# Patient Record
Sex: MALE | Race: WHITE
[De-identification: names, ages, dates, MRNs, and addresses within clinical notes are randomized per-mention and may not be internally consistent; named-entity substitution may affect disease eponyms.]

---

## 2018-06-15 ENCOUNTER — HOSPITAL ENCOUNTER (EMERGENCY)
Dept: HOSPITAL 25 - ED | Age: 68
Discharge: HOME | End: 2018-06-15
Payer: MEDICARE

## 2018-06-15 VITALS — SYSTOLIC BLOOD PRESSURE: 141 MMHG | DIASTOLIC BLOOD PRESSURE: 75 MMHG

## 2018-06-15 DIAGNOSIS — I25.119: ICD-10-CM

## 2018-06-15 DIAGNOSIS — I10: ICD-10-CM

## 2018-06-15 DIAGNOSIS — R19.7: ICD-10-CM

## 2018-06-15 DIAGNOSIS — R53.1: ICD-10-CM

## 2018-06-15 DIAGNOSIS — Z79.899: ICD-10-CM

## 2018-06-15 DIAGNOSIS — E78.00: ICD-10-CM

## 2018-06-15 DIAGNOSIS — Z95.5: ICD-10-CM

## 2018-06-15 DIAGNOSIS — I49.3: ICD-10-CM

## 2018-06-15 DIAGNOSIS — R11.2: Primary | ICD-10-CM

## 2018-06-15 DIAGNOSIS — Z79.82: ICD-10-CM

## 2018-06-15 DIAGNOSIS — I44.4: ICD-10-CM

## 2018-06-15 DIAGNOSIS — K21.9: ICD-10-CM

## 2018-06-15 LAB
BASOPHILS # BLD AUTO: 0 10^3/UL (ref 0–0.2)
EOSINOPHIL # BLD AUTO: 0 10^3/UL (ref 0–0.6)
HCT VFR BLD AUTO: 47 % (ref 42–52)
HGB BLD-MCNC: 16 G/DL (ref 14–18)
INR PPP/BLD: 0.93 (ref 0.77–1.02)
LYMPHOCYTES # BLD AUTO: 0.5 10^3/UL (ref 1–4.8)
MCH RBC QN AUTO: 33 PG (ref 27–31)
MCHC RBC AUTO-ENTMCNC: 34 G/DL (ref 31–36)
MCV RBC AUTO: 96 FL (ref 80–94)
MONOCYTES # BLD AUTO: 0.2 10^3/UL (ref 0–0.8)
NEUTROPHILS # BLD AUTO: 9.6 10^3/UL (ref 1.5–7.7)
NRBC # BLD AUTO: 0 10^3/UL
NRBC BLD QL AUTO: 0
PLATELET # BLD AUTO: 265 10^3/UL (ref 150–450)
RBC # BLD AUTO: 4.86 10^6/UL (ref 4–5.4)
WBC # BLD AUTO: 10.4 10^3/UL (ref 3.5–10.8)

## 2018-06-15 PROCEDURE — 96375 TX/PRO/DX INJ NEW DRUG ADDON: CPT

## 2018-06-15 PROCEDURE — 84484 ASSAY OF TROPONIN QUANT: CPT

## 2018-06-15 PROCEDURE — 85730 THROMBOPLASTIN TIME PARTIAL: CPT

## 2018-06-15 PROCEDURE — 82553 CREATINE MB FRACTION: CPT

## 2018-06-15 PROCEDURE — 71045 X-RAY EXAM CHEST 1 VIEW: CPT

## 2018-06-15 PROCEDURE — 85025 COMPLETE CBC W/AUTO DIFF WBC: CPT

## 2018-06-15 PROCEDURE — 36415 COLL VENOUS BLD VENIPUNCTURE: CPT

## 2018-06-15 PROCEDURE — 83735 ASSAY OF MAGNESIUM: CPT

## 2018-06-15 PROCEDURE — 96374 THER/PROPH/DIAG INJ IV PUSH: CPT

## 2018-06-15 PROCEDURE — 82550 ASSAY OF CK (CPK): CPT

## 2018-06-15 PROCEDURE — 83690 ASSAY OF LIPASE: CPT

## 2018-06-15 PROCEDURE — 81003 URINALYSIS AUTO W/O SCOPE: CPT

## 2018-06-15 PROCEDURE — 86140 C-REACTIVE PROTEIN: CPT

## 2018-06-15 PROCEDURE — 83605 ASSAY OF LACTIC ACID: CPT

## 2018-06-15 PROCEDURE — 83880 ASSAY OF NATRIURETIC PEPTIDE: CPT

## 2018-06-15 PROCEDURE — 93005 ELECTROCARDIOGRAM TRACING: CPT

## 2018-06-15 PROCEDURE — 85610 PROTHROMBIN TIME: CPT

## 2018-06-15 PROCEDURE — 84443 ASSAY THYROID STIM HORMONE: CPT

## 2018-06-15 PROCEDURE — 99283 EMERGENCY DEPT VISIT LOW MDM: CPT

## 2018-06-15 PROCEDURE — 80053 COMPREHEN METABOLIC PANEL: CPT

## 2018-06-15 NOTE — ED
Zak RAMÍREZ Natalie, kerryibed for Bob Hearn MD on 06/15/18 at 2237 .





Re-Evaluation





- Re-Evaluation


  ** First Eval


Re-Evaluation Time: 23:14


Change: Improved


Comment: Pt's repeat troponin negative. pt resting comfortably in bed without 

any chest discomfort. Pt vomiting and diarrhea resolved. Pt tolerating po 

without difficulty.





Course/Dx





- Diagnoses


Provider Diagnoses: 


 Nausea, vomiting, and diarrhea








Discharge





- Sign-Out/Discharge


Documenting (check all that apply): Discharge/Admit/Transfer





- Discharge Plan


Condition: Stable


Disposition: HOME


Prescriptions: 


Metoclopramide TAB* [Reglan TAB*] 10 mg PO Q8H PRN #10 tab


 PRN Reason: Vomiting


Patient Education Materials:  Acute Nausea and Vomiting (ED), Acute Diarrhea (ED

)


Referrals: 


Ryne Bryant DO [Primary Care Provider] - 3 Days


Additional Instructions: 


Please follow up with your primary care provider.





RETURN TO THE ED FOR ANY NEW OR WORSENING SYMPTOMS.





- Billing Disposition and Condition


Condition: STABLE


Disposition: Home





The documentation as recorded by the Zak bailon Natalie accurately reflects 

the service I personally performed and the decisions made by Nadiya farmer Omari A, MD.

## 2018-06-15 NOTE — RAD
INDICATION:  Chest pain.



COMPARISON:  Comparison is made with a prior study from January 10, 2015.



TECHNIQUE: A portable view of the chest was obtained.



FINDINGS: Cardiac and mediastinal contours appear to be within normal limits.



The lungs are clear. No pleural effusion is seen.



IMPRESSION:  NO EVIDENCE FOR ACUTE DISEASE.

## 2018-06-18 NOTE — ED
Isauro RAMÍREZ Angela, scribed for Brandyn Lam MD on 06/15/18 at 1853 .





GI/ HPI





- HPI Summary


HPI Summary: 





This pt is a 67 y/o male presenting to [facility] via EMS c/o nausea, vomiting, and 

diarrhea since this morning. Pt reports dry heaves and chills all day today. He 

additionally states left arm weakness. He describes diarrhea as watery and 

brown. Pt describes emesis as clear/yellow, "almost like urine." Denies chest 

pain, SOB, abd pain, fever. 


Pt denies sick contacts at home. Denies recent antibiotics. He denies recent 

long distance traveling. 


PMHx includes cardiac stents. Pt states he was found to have PVCs by his 

cardiologist, Dr. Claudio. Dr. Claudio told the pt he will need to have stress 

tests done. 





- History of Current Complaint


Chief Complaint: EDGeneral


Time Seen by Provider: 06/15/18 18:29


Stated Complaint: VOMITING/LT ARM WEAKNESS


Hx Obtained From: Patient


Onset/Duration: Started Hours Ago, Still Present


Timing: Lasting Hours


Current Severity: None


Pain Intensity: 0 - denies pain


Associated Signs and Symptoms: Positive: Nausea, Vomiting, Diarrhea.  Negative: 

Fever, Chills, Abdominal Pain, Chest Pain


Aggravating Factor(s): Nothing


Alleviating Factor(s): Nothing





- Allergy/Home Medications


Allergies/Adverse Reactions: 


 Allergies











Allergy/AdvReac Type Severity Reaction Status Date / Time


 


No Known Allergies Allergy   Verified 01/19/15 05:04














PMH/Surg Hx/FS Hx/Imm Hx


Endocrine/Hematology History: 


   Denies: Hx Sickle Cell Disease


Cardiovascular History: Reports: Hx Angina, Hx Coronary Artery Disease, Hx 

Hypercholesterolemia, Hx Hypertension


   Denies: Hx Myocardial Infarction, Hx Valvular Heart Disease, Other 

Cardiovascular Problems/Disorders


Respiratory History: Reports: Hx Sleep Apnea


   Denies: Hx Asthma, Hx Chronic Obstructive Pulmonary Disease (COPD)


GI History: Reports: Hx Gastroesophageal Reflux Disease - TAKES MEDICATION, 

UNDER CONTROL


 History: 


   Denies: Other  Problems/Disorders


Musculoskeletal History: 


   Denies: Other Musculoskeletal History


Sensory History: Reports: Hx Cataracts, Hx Contacts or Glasses - ADVISED TO 

WEAR GLASSES DAY OF SURGERY


   Denies: Hx Hearing Aid


Opthamlomology History: Reports: Hx Cataracts, Hx Contacts or Glasses - ADVISED 

TO WEAR GLASSES DAY OF SURGERY


Neurological History: 


   Denies: Other Neuro Impairments/Disorders





- Surgical History


Surgery Procedure, Year, and Place: TONSILS AS CHILD.  COLONOSCOPY X 2


Hx Anesthesia Reactions: No


Infectious Disease History: No


Infectious Disease History: 


   Denies: Traveled Outside the [place] in Last 30 Days





- Family History


Known Family History: Positive: Diabetes - Mother


Family History: Mother: arthritis





- Social History


Alcohol Use: Occasionally


Alcohol Amount: 2 beers


Substance Use Type: Reports: None


Smoking Status (MU): Never Smoked Tobacco





Review of Systems


Positive: Chills.  Negative: Fever


Negative: Chest Pain


Negative: Shortness Of Breath


Positive: Vomiting, Diarrhea, Nausea.  Negative: Abdominal Pain


Genitourinary: Negative


Positive: Weakness - on left arm


All Other Systems Reviewed And Are Negative: Yes





Physical Exam





- Summary


Physical Exam Summary: 





VITAL SIGNS: Reviewed.


GENERAL: Patient is a well-developed and nourished male who is lying 

comfortable in the stretcher. Patient is not in any acute respiratory distress.


HEAD AND FACE: No signs of trauma. No ecchymosis, hematomas or skull 

depressions. No sinus tenderness.


EYES: PERRLA, EOMI x 2, No injected conjunctiva, no nystagmus.


EARS: Hearing grossly intact. Ear canals and tympanic membranes are within 

normal limits.


MOUTH: Oropharynx within normal limits.


NECK: Supple, trachea is midline, no adenopathy, no JVD, no carotid bruit, no c-

spine tenderness, neck with full ROM.


CHEST: Symmetric, no tenderness at palpation


LUNGS: Clear to auscultation bilaterally. No wheezing or crackles.


CVS: Regular rate and rhythm, S1 and S2 present, no murmurs or gallops 

appreciated.


ABDOMEN: Soft, non-tender. No signs of distention. No rebound no guarding, and 

no masses palpated. Bowel sounds are normal.


EXTREMITIES: FROM in all major joints, no edema, no cyanosis or clubbing.


NEURO: Alert and oriented x 3. No acute neurological deficits. Speech is normal 

and follows commands.


SKIN: Dry and warm


Triage Information Reviewed: Yes


Vital Signs On Initial Exam: 


 Initial Vitals











Temp Pulse Resp BP Pulse Ox


 


 98.1 F   69   18   145/73   98 


 


 06/15/18 18:28  06/15/18 18:28  06/15/18 18:28  06/15/18 18:28  06/15/18 18:28











Vital Signs Reviewed: Yes





Diagnostics





- Vital Signs


 Vital Signs











  Temp Pulse Resp BP Pulse Ox


 


 06/15/18 18:28  98.1 F  69  18  145/73  98














- Laboratory


Result Diagrams: 


 06/15/18 18:48





 06/15/18 18:48


Lab Statement: Any lab studies that have been ordered have been reviewed, and 

results considered in the medical decision making process.





- Radiology


  ** Chest XR


Xray Interpretation: No Acute Changes - IMPRESSION: No evidence for acute 

disease. Dr. Lam has reviewed this radiology report.


Radiology Interpretation Completed By: Radiologist





- EKG


  ** 18:36


Cardiac Rate: NL - at 66 bpm


EKG Rhythm: Sinus Rhythm


Ectopy: PVCs - multiple


EKG Interpretation: No ST elevations





Re-Evaluation





- Re-Evaluation


  ** First Eval


Re-Evaluation Time: 20:45


Change: Improved


Comment: Pt is feeling better. He does not have any nausea or vomiting.





GIGU Course/Dx





- Course


Assessment/Plan: Pt is a 67 y/o male who presents with nausea, vomiting, and 

diarrhea since this morning. Pt reports dry heaves and chills all day today. He 

additionally states left arm weakness. He describes diarrhea as watery and 

brown. Pt describes emesis as clear/yellow, "almost like urine." Denies chest 

pain, SOB, abd pain, fever.  Test results without any significant abnormalities 

except for glucose of 158, lactic acid of 2.9, magnesium of 1.7, for which the 

pt was given magnesium oxide. Pt has not given any stool samples.  Chest XR 

shows no evidence for acute disease.  He was hydrated with 2 L of IV fluids and 

he feels better. The pt was also given Reglan for the nausea and vomiting and 

his symptoms resolved. He was given a PO challenge and is feeling better. At 

this time the second troponin is still pending.  Pt will be signed out to Dr. Hearn at shift change, pending disposition, awaiting second troponin. If 

second troponin is negative the pt can be discharged home with follow up from 

PCP.





- Diagnoses


Provider Diagnoses: 


 Nausea, vomiting, and diarrhea








Discharge





- Sign-Out/Discharge


Documenting (check all that apply): Sign-Out Patient


Signing out patient TO: Bob Hearn - pending dispo, awaiting second 

troponin. 





- Discharge Plan


Condition: Stable


Disposition: HOME


Prescriptions: 


Metoclopramide TAB* [Reglan TAB*] 10 mg PO Q8H PRN #10 tab


 PRN Reason: Vomiting


Patient Education Materials:  Acute Nausea and Vomiting (ED), Acute Diarrhea (ED

)


Referrals: 


Ryne Bryant,  [Primary Care Provider] - 3 Days


Additional Instructions: 


Please follow up with your primary care provider.





RETURN TO THE ED FOR ANY NEW OR WORSENING SYMPTOMS.





The documentation as recorded by the Isauro bailon Angela accurately reflects 

the service I personally performed and the decisions made by me, Brandyn Lam MD.

## 2019-11-04 NOTE — HP
PREOPERATIVE HISTORY AND PHYSICAL:

 

DATE OF ADMISSION/SURGERY:  11/19/19

 

DATE OF OFFICE VISIT/ENCOUNTER:  10/30/19

 

ATTENDING SURGEON:  Alexa Davidson MD.* (DICTATED BY CHIKI IVERSON)

 

CARDIOLOGIST:  Dr. Claudio.

 

PROCEDURE:  Extensor tenosynovectomy right wrist.

 

HISTORY OF PRESENT ILLNESS:  This is a 69-year-old male.  He complains of 2 
areas of swelling on his right wrist that has been present for over a year and 
a half. He denies any injury.  The areas of swelling involve the third and 
second extensor compartment of the right wrist.  He denies any significant pain 
with these areas of swelling, but they have grown larger over time.  He has 
some baseline discomfort in both of his wrists secondary to osteoarthritic 
changes.  He denies any numbness or tingling.  He has been treated in the past 
by Dr. Davidson for this wrist in 2013 for extensor tenosynovitis.  He had a 
tenosynovectomy performed at that time.  He has currently been diagnosed with 
extensor tenosynovitis again and Dr. Davidson is recommending surgical 
intervention.  The patient has consented to proceed.  He has has a history of 
coronary artery disease and cardiac stent placement, and we will get clearance 
from his cardiologist Dr. Claudio prior to proceeding with surgery.

 

PAST MEDICAL HISTORY:

1.  Hypercholesterolemia.

2.  Coronary artery disease.

3.  Sleep apnea with CPAP.

4.  GERD.

5.  Osteoarthritis.

 

PAST SURGICAL HISTORY:

1.  Stent placement x2 four years ago.

2.  Extensor tenosynovectomy right wrist.

 

CURRENT MEDICATIONS:

1.  Acetaminophen 500 mg 1 tab b.i.d. p.r.n. pain.

2.  Aspirin 81 mg daily.

3.  Atorvastatin calcium 20 mg daily.

4.  Multivitamin daily.

5.  Diclofenac sodium 75 mg twice a day.

6.  Glucosamine chondroitin 500 mg b.i.d.

7.  Nitrostat 0.4 mg 1 sublingual q.5 minutes up to 3 doses p.r.n.

8.  Omeprazole 20 mg daily.

9.  Potassium chloride ER 10 mEq daily.

 

ALLERGIES:  No known drug allergies.

 

FAMILY MEDICAL HISTORY:  Noncontributory.

 

SOCIAL HISTORY:  The patient is retired.  He denies tobacco and recreational 
drug use.  He drinks alcohol on occasion.

 

REVIEW OF SYSTEMS:  Negative for general, cephalic, cardiovascular, respiratory
, GI, , other musculoskeletal, integumentary, endocrine, neurologic, and 
hematologic symptoms.  Infectious Disease:  Negative for MRSA, hepatitis C, HIV.

 

                               PHYSICAL EXAMINATION

 

GENERAL:  A well-developed, well-nourished 69-year-old male, in no acute 
distress.

 

VITAL SIGNS:  Height 5 feet 7 inches, weight 177 pounds.  Pulse rate 64, blood 
pressure 144/88.

 

HEENT:  Normocephalic, atraumatic.  Pupils are equal, round, and reactive to 
light and accommodation.  Extraocular movements are intact.  Throat is clear.

 

NECK:  Supple.  No palpable lymph nodes.

 

PULMONARY:  Lungs are clear to auscultation bilaterally.  No wheezes, rales, or 
rhonchi.

 

CARDIOVASCULAR:  Regular rate and rhythm.  S1, S2.  No murmurs, rubs, or 
gallops. No edema.

 

ABDOMEN:  Positive bowel sounds.  Soft, nontender.

 

NEUROLOGICAL:  Alert and oriented x3.  Cranial nerves II through XII are 
intact. Sensation is intact to light touch.

 

MUSCULOSKELETAL:  On exam of his right wrist, he has a large area of extensor 
tenosynovitis involving the third and second extensor tendon compartment. 
Minimally tender to palpation.  He has good motion in his wrist and fingers.  
Skin is intact.  Neurovascular function is intact.

 

 IMAGING:  X-rays AP, lateral, and oblique of the right wrist and forearm show 
osteoarthritic changes scattered throughout the wrist carpal bones and at the 
radiocarpal joint.  Soft tissue swelling is appreciated on the x-rays.

 

IMPRESSION:  Right wrist extensor tenosynovitis.

 

PLAN:  The patient is scheduled to undergo surgical intervention in the form of 
an extensor tenosynovectomy right wrist with Dr. Davidson on 11/19/19.  He will 
return to the office 10 days postop for followup and suture removal.  A 
prescription for tramadol was e-scribed to the patient's pharmacy for 
postoperative pain management.

 

 ____________________________________ CHIKI IVERSON

 

[id number]/[facility] #: [id number]

MTDROSLYN

## 2019-11-19 ENCOUNTER — HOSPITAL ENCOUNTER (OUTPATIENT)
Dept: HOSPITAL 25 - OREAST | Age: 69
Discharge: HOME | End: 2019-11-19
Attending: ORTHOPAEDIC SURGERY
Payer: MEDICARE

## 2019-11-19 VITALS — DIASTOLIC BLOOD PRESSURE: 87 MMHG | SYSTOLIC BLOOD PRESSURE: 138 MMHG

## 2019-11-19 DIAGNOSIS — Z95.5: ICD-10-CM

## 2019-11-19 DIAGNOSIS — G47.33: ICD-10-CM

## 2019-11-19 DIAGNOSIS — I10: ICD-10-CM

## 2019-11-19 DIAGNOSIS — E78.00: ICD-10-CM

## 2019-11-19 DIAGNOSIS — E78.5: ICD-10-CM

## 2019-11-19 DIAGNOSIS — K21.9: ICD-10-CM

## 2019-11-19 DIAGNOSIS — M65.831: Primary | ICD-10-CM

## 2019-11-19 DIAGNOSIS — I25.10: ICD-10-CM

## 2019-11-19 DIAGNOSIS — M19.90: ICD-10-CM

## 2019-11-19 PROCEDURE — 88304 TISSUE EXAM BY PATHOLOGIST: CPT

## 2019-11-19 NOTE — OP
DATE OF OPERATION:  11/19/19 [facility]

 

DATE OF BIRTH:  [birth year]

 

SURGEON:  Alexa Davidson MD

 

ASSISTANT:  CHIKI Rodríguez

 

ANESTHESIA:  General.

 

PRE-OP DIAGNOSIS:  Right wrist extensor tenosynovitis.

 

POST-OP DIAGNOSIS:  Right wrist extensor tenosynovitis.

 

OPERATIVE PROCEDURE:  Right wrist extensor tenosynovectomy in two compartments.

 

ESTIMATED BLOOD LOSS:  Zero.

 

TOURNIQUET TIME:  About 30 minutes.

 

INDICATION FOR PROCEDURE:  David is a 69-year-old man who has a history of 
extensor tenosynovectomy in his fourth extensor compartment.  He now has 
extensor tenosynovitis in his third extensor compartment and second extensor 
compartment. He presents for tenosynovectomy.

 

DESCRIPTION OF PROCEDURE:  The patient was brought to the operating room and 
was given a general anesthetic and placed in the supine position on the 
operating table with a tourniquet around his right upper arm.  Skin of his 
right upper extremity was prepped and draped in the usual sterile fashion.  The 
hand and forearm were exsanguinated and the tourniquet elevated to 250 mmHg.  
Total of 30 cc of Marcaine 0.5% plain was injected in the area of the 
incisions.  

A longitudinal incision was made over the second compartment tenosynovitis and 
we carefully dissected down to the tenosynovitis and debrided away from the 
tendons.  The sensory nerve was retracted by the surgical assistant, Stephen Kincaid.  This tissue was sent for pathology.  The wound was irrigated and 
the skin edges reapproximated with 4-0 nylon suture.  Next, an incision was 
made over the EPL tendon distal to the extensor retinaculum and this was 
encased in extensor tenosynovitis.  This too was carefully debrided avoiding 
again branches of the radial sensory nerve and was sent for pathology.  The 
tendons were in good condition.  The wound was irrigated and the skin edges 
reapproximated with 4-0 nylon suture.  The wound was dressed with Xeroform, 4x4
, ABD, Webril and an Ace wrapped.  Prior to closure, a Penrose drain was placed 
in the wounds and this will be removed in 2 days.

 

 [id number]/[facility] #: [id number]

MTDD